# Patient Record
Sex: MALE | Race: WHITE | NOT HISPANIC OR LATINO | Employment: OTHER | URBAN - METROPOLITAN AREA
[De-identification: names, ages, dates, MRNs, and addresses within clinical notes are randomized per-mention and may not be internally consistent; named-entity substitution may affect disease eponyms.]

---

## 2019-05-27 ENCOUNTER — TRANSFERRED RECORDS (OUTPATIENT)
Dept: HEALTH INFORMATION MANAGEMENT | Facility: CLINIC | Age: 79
End: 2019-05-27

## 2020-08-04 ENCOUNTER — TRANSFERRED RECORDS (OUTPATIENT)
Dept: HEALTH INFORMATION MANAGEMENT | Facility: CLINIC | Age: 80
End: 2020-08-04

## 2020-08-11 ENCOUNTER — TELEPHONE (OUTPATIENT)
Dept: WOUND CARE | Facility: CLINIC | Age: 80
End: 2020-08-11

## 2020-08-11 NOTE — TELEPHONE ENCOUNTER
Received referral for patient to be seen by Dr Howard. Patient is currently being seen at wound clinic in [facility]. He will have his PCP send a photo to the clinic phone during his appointment today 08/11/2020. Will then schedule visit with Kaleb

## 2020-08-13 NOTE — TELEPHONE ENCOUNTER
Spoke with [facility] director Penelope and she will send the wound photos they were able to get. Once received patient will need to be contacted to be scheduled with Jackie to determine if he is an appropriate patient to see Dr. Howard.

## 2020-08-13 NOTE — TELEPHONE ENCOUNTER
[facility] wound clinic was suppose to send wound photo on Tuesday. Have not received any photo yet. Patient will be seeing them for a dressing change tomorrow and will confirm they have a wound photo and are able to send it to us.

## 2020-08-13 NOTE — TELEPHONE ENCOUNTER
Ischial Tuberosity wound: 0.5 X 0.3 X 3.7    Patient scheduled with Jackie Tuesday 9/8/2020 @ 8:50am.

## 2020-09-08 ENCOUNTER — HOSPITAL ENCOUNTER (OUTPATIENT)
Dept: WOUND CARE | Facility: CLINIC | Age: 80
Discharge: HOME | End: 2020-09-08
Attending: PHYSICIAN ASSISTANT | Admitting: PHYSICIAN ASSISTANT
Payer: COMMERCIAL

## 2020-09-08 VITALS
HEART RATE: 97 BPM | HEIGHT: 71 IN | DIASTOLIC BLOOD PRESSURE: 69 MMHG | SYSTOLIC BLOOD PRESSURE: 148 MMHG | RESPIRATION RATE: 18 BRPM | BODY MASS INDEX: 41.72 KG/M2 | WEIGHT: 298 LBS | TEMPERATURE: 97.3 F

## 2020-09-08 DIAGNOSIS — L89.314 PRESSURE INJURY OF RIGHT ISCHIUM, STAGE 4 (H): ICD-10-CM

## 2020-09-08 PROCEDURE — 97602 WOUND(S) CARE NON-SELECTIVE: CPT

## 2020-09-08 PROCEDURE — 99202 OFFICE O/P NEW SF 15 MIN: CPT | Performed by: PHYSICIAN ASSISTANT

## 2020-09-08 PROCEDURE — G0463 HOSPITAL OUTPT CLINIC VISIT: HCPCS | Mod: 25

## 2020-09-08 ASSESSMENT — MIFFLIN-ST. JEOR: SCORE: 2088.85

## 2020-09-08 NOTE — PROGRESS NOTES
[facility]    ASSESSMENT:   1. Stage 4 pressure ulcer of R IT  2. History of osteomyelitis of R IT, currently felt to be treated    PLAN/DISCUSSION:   1. Due to small size of wound and resolved osteomyelitis would not currently recommend flap surgery. If wound stalls and/or OM recurs could consider I&D (including bone if indicated) with NPWT placement or possible flap coverage with Dr. Howard. At this point, recommend he continues to work with [facility] as he is receiving excellent wound care with their team.   2. Continue wound care dressing plan per [facility] team    HISTORY OF PRESENT ILLNESS:   Lanre Choi is a 80 year old male who presents today for a surgical consult regarding a stage 4 pressure ulcer of his right IT. Mr. Choi has had several back surgeries and has been unable to ambulate since May 2019. Developed a stage 4 pressure ulcer in June 2019. Evidence of OM in September for which he completed a course of IV antibiotics. Repeat MRI in February suspicious for residual OM. Bone biopsy was then performed in March and was negative for OM. Started on NuShield weekly with NPWT and did 5 treatments. In April concern for OM per ortho team. Started HBO in May and completed 60 dives. Restarted NuShield in June. Switched to PuraPly in July. Last measurement I have from the [facility] team in 4 cm in depth.     OFFLOADING: on group 2 mattress, on Roho cushion that has been pressure mapped, wheelchair was his friends    BOWEL/BLADDER: occasional incontinence, utilizes bedpan     REVIEW OF SYSTEMS:  CONSTITUTIONAL: Denies fevers or acute illness  ENDOCRINE: diabetes under good control    PAST MEDICAL HISTORY:   Patient Active Problem List   Diagnosis     Coronary atherosclerosis     Diabetes mellitus, type 2 (H)     Essential hypertension     Hyperlipidemia     Obesity     Generalized osteoarthrosis, involving multiple sites     Sleep apnea     Esophageal reflux      "Cellulitis and abscess of leg, except foot     Clostridium difficile diarrhea     Pressure injury of right ischium, stage 4 (H)     SOCIAL HISTORY: residing in [facility], has [facility] in [place] with wife  TOBACCO STATUS:  has no history on file for tobacco.    MEDICATIONS:   Current Outpatient Medications   Medication     amLODIPine (NORVASC) 10 MG tablet     aspirin 325 MG tablet     cefadroxil (DURICEF) 500 MG capsule     clopidogrel (PLAVIX) 75 MG tablet     furosemide (LASIX) 80 MG tablet     LANTUS VIAL 100 UNITS/ML SC SOLN     lisinopril (PRINIVIL,ZESTRIL) 40 MG tablet     metFORMIN (GLUCOPHAGE) 1000 MG tablet     metoprolol (LOPRESSOR) 25 MG tablet     Multiple Vitamins-Minerals (THEREMS M) TABS     oxyCODONE-acetaminophen (ROXICET) 5-325 MG per tablet     potassium chloride SA (K-DUR,KLOR-CON M) 20 MEQ tablet     simvastatin (ZOCOR) 40 MG tablet     spironolactone (ALDACTONE) 25 MG tablet     zolpidem (AMBIEN) 5 MG tablet     No current facility-administered medications for this encounter.        VITALS: BP (!) 148/69   Pulse 97   Temp 97.3  F (36.3  C) (Temporal)   Resp 18   Ht 1.803 m (5' 11\")   Wt 135.2 kg (298 lb)   BMI 41.56 kg/m       RELEVANT IMAGING: MRI from June reviewed in Care Everywhere    PHYSICAL EXAM:  GENERAL: Patient is alert and oriented and in no acute distress  INTEGUMENTARY:   Wound (used by OP [facility] only) 09/08/20 0840 Right ischial tuberosity pressure injury (Active)   Length (cm) 1 09/08/20 0800   Width (cm) 0.4 09/08/20 0800   Depth (cm) 3.5 09/08/20 0852   Wound (cm^2) 0.4 cm^2 09/08/20 0800   Wound Volume (cm^3) 0.96 cm^3 09/08/20 0800   Dressing Appearance moist drainage 09/08/20 0800   Drainage Characteristics/Odor serosanguineous 09/08/20 0800   Drainage Amount copious 09/08/20 0852   Thickness/Stage Stage 4 09/08/20 0852   Base red/granulating 09/08/20 0852   Periwound intact 09/08/20 0852   Periwound Temperature warm 09/08/20 0852   Care, Wound non-select wound debridement " performed 09/08/20 8208           FOLLOW-UP: with [facility], PRN with us if wound stalls or recurrent OM    ERIC LINDQUIST PA-C

## 2020-09-08 NOTE — PROGRESS NOTES
Patient arrived for wound care visit. Certified Wound Care Nurse time spent evaluating patient record, completed a full evaluation and documented wound(s) & delma-wound skin; provided recommendation based on treatment plan. Applied dressing, reviewed discharge instructions, patient education, and discussed plan of care with appropriate medical team staff members and patient and/or family members.

## 2020-09-08 NOTE — LETTER
September 8, 2020      Dear Dr. Sexton,    Thank you for your referral of Mr. Lanre Choi. At this point we do not recommend flap surgery due to the relatively small size of the wound, resolved osteomyelitis and impression that the wound is improving with your excellent care. If the wound stalls and/or osteomyelitis recurs Mr. Choi may be a candidate for surgical intervention with our team. He would likely be considered for a bone debridement with NPWT or flap coverage. We would be happy to see him in the future for another consult, but at this point we will have him continue to follow with your team.       Respectfully,        Niyah Duran PA-C

## 2020-09-08 NOTE — DISCHARGE INSTRUCTIONS
"    [facility]  [address] [facility] [address] [address]    Call us at [phone] if you have any questions about your wounds, have redness or swelling around your wound, have a fever of 101 or greater or if you have any other problems or concerns. We answer the phone Monday through Friday 8 am to 4 pm, please leave a message as we check the voicemail frequently throughout the day.     Lanre Choi      1940    A diet high in protein is important for wound healing, we recommend getting 90 grams of protein per day. Taking protein shakes or bars are a good way to get extra protein in your diet.  Other good sources of protein:  Pork 26g per 3 oz  Whey protein powder - 24g per scoop (on average)  Greek yogurt - 23g per 8oz   Chicken or Turkey - 23g per 3oz  Fish - 20-25g per 3oz  Beef - 18-23g per 3oz  Navy beans - 20g per cup  Cottage cheese - 14g per 1/2 cup   Lentils - 13g per 1/4 cup  Beef jerky 13g per 1oz  2% milk - 8g per cup  Peanut butter - 8g per 2 tablespoons  Eggs - 6g per egg  Mixed nuts - 6g per 2oz     The wound is too small to be considered for a flap surgery and patient is not interested in this option due to the long rehab process post procedure (would have to lay flat in bed for 4-6 weeks)    Continue current wound care to right ischial tuberosity: After cleansing with saline or wound cleanser, apply small amount of VASHE on gauze, lay into wound bed, let sit for 10 minutes, remove gauze (do not rinse) then apply dressing. 1/2\" Iodoform placed in our clinic today followed by adhesive foam.     Niyah Duran PA-C. September 8, 2020    Follow up with [facility]  " 62 y.o. M with h/o chronic a-fib on A/C, DM2, HTN, Cardiomyopathy, ? TIA's, admitted for lap partial left nephrectomy. S/P OR, POD #4.

## 2020-10-06 ENCOUNTER — HOSPITAL ENCOUNTER (OUTPATIENT)
Dept: WOUND CARE | Facility: CLINIC | Age: 80
End: 2020-10-06
Attending: PHYSICIAN ASSISTANT
Payer: COMMERCIAL

## 2020-10-06 DIAGNOSIS — L89.314 PRESSURE INJURY OF RIGHT ISCHIUM, STAGE 4 (H): ICD-10-CM

## 2020-10-06 PROCEDURE — 99213 OFFICE O/P EST LOW 20 MIN: CPT | Performed by: PHYSICIAN ASSISTANT

## 2020-10-06 RX ORDER — IBUPROFEN 200 MG/1
TABLET, FILM COATED ORAL
COMMUNITY
Start: 2020-04-13

## 2020-10-06 RX ORDER — CALCITONIN SALMON 200 [IU]/.09ML
1 SPRAY, METERED NASAL
COMMUNITY

## 2020-10-06 RX ORDER — AMOXICILLIN 250 MG
2 CAPSULE ORAL
COMMUNITY
Start: 2020-01-23

## 2020-10-06 RX ORDER — ACETAMINOPHEN 500 MG
1000 TABLET ORAL
COMMUNITY
Start: 2019-10-17

## 2020-10-06 RX ORDER — LOSARTAN POTASSIUM 25 MG/1
25 TABLET ORAL
COMMUNITY
Start: 2019-10-18

## 2020-10-06 RX ORDER — OXYCODONE HYDROCHLORIDE 20 MG/1
TABLET ORAL
COMMUNITY
Start: 2020-09-22

## 2020-10-06 RX ORDER — CALCIUM CARBONATE 500 MG/1
500 TABLET, CHEWABLE ORAL
COMMUNITY

## 2020-10-06 RX ORDER — POTASSIUM CHLORIDE 750 MG/1
10 TABLET, EXTENDED RELEASE ORAL
COMMUNITY
Start: 2020-01-24

## 2020-10-06 RX ORDER — FERROUS SULFATE 325(65) MG
325 TABLET ORAL 2 TIMES DAILY
COMMUNITY
Start: 2020-08-31

## 2020-10-06 RX ORDER — TORSEMIDE 20 MG/1
10 TABLET ORAL
COMMUNITY
Start: 2019-10-18

## 2020-10-06 RX ORDER — POLYETHYLENE GLYCOL 3350 17 G/17G
17 POWDER, FOR SOLUTION ORAL
COMMUNITY
Start: 2019-06-03

## 2020-10-06 RX ORDER — ACETAMINOPHEN 325 MG/1
650 TABLET ORAL
COMMUNITY
Start: 2019-10-17

## 2020-10-06 RX ORDER — ALENDRONATE SODIUM 70 MG/1
70 TABLET ORAL
COMMUNITY

## 2020-10-06 RX ORDER — TAMSULOSIN HYDROCHLORIDE 0.4 MG/1
0.8 CAPSULE ORAL
COMMUNITY

## 2020-10-06 RX ORDER — GABAPENTIN 300 MG/1
900 CAPSULE ORAL
COMMUNITY
Start: 2019-09-13

## 2020-10-06 RX ORDER — FINASTERIDE 5 MG/1
5 TABLET, FILM COATED ORAL
COMMUNITY
Start: 2019-10-18

## 2020-10-06 NOTE — DISCHARGE INSTRUCTIONS
[facility]  [address] [facility] [address] [facility] [address]    Call us at [phone] if you have any questions about your wounds, have redness or  swelling around your wound, have a fever of 101 or greater or if you have any other  problems or concerns. We answer the phone Monday through Friday 8 am to 4 pm,  please leave a message as we check the voicemail frequently throughout the day.    Lanre Choi 1940    A diet high in protein is important for wound healing, we recommend getting 90 grams  of protein per day. Taking protein shakes or bars are a good way to get extra protein in  your diet. Other good sources of protein:  Pork 26g per 3 oz  Whey protein powder - 24g per scoop (on average)  Greek yogurt - 23g per 8oz  Chicken or Turkey - 23g per 3oz  Fish - 20-25g per 3oz  Beef - 18-23g per 3oz  Navy beans - 20g per cup  Cottage cheese - 14g per 1/2 cup  Lentils - 13g per 1/4 cup  Beef jerky 13g per 1oz  2% milk - 8g per cup  Peanut butter - 8g per 2 tablespoons  Eggs - 6g per egg  Mixed nuts - 6g per 2oz    Options for wound:  1. Repeat imaging (CT scan or MRI) and investigate if there is osteomyelitis. If there is, have Infectious Disease manage antibiotics after getting a bone biopsy and debridement with wound vac placement done by a General Surgeon closer to patient's home. The patient is not interested in this option.  2.The wound is too small to be considered for a flap surgery and patient is not interested in this option due to the long rehab process post procedure (would have to lay flat in bed for 4-6 weeks).  3. Do no treatment (sugically) and have a chronic wound. Patient would like to pursue this option at this time.     Wound care recommendations to right ischial tuberosity  After cleansing with saline or wound cleanser, irrigate with a small amount of VASHE and cover with gauze, let sit for 10 minutes, remove gauze (do not rinse) then apply cover dressing.  (Do not pack wound) and change daily.    Niyah Duran PA-C. October 5, 2020    Follow up with [facility]

## 2020-10-06 NOTE — PROGRESS NOTES
[facility]    ASSESSMENT:   1. Stage 4 pressure ulcer of R IT  2. History of osteomyelitis of R IT, suspicion of chronic infection    PLAN/DISCUSSION:   1. Discussed options with Mr. Choi:  1. Transition to palliative plan - use diapers as cover dressing and monitor for worsening/infection lifelong  2. If desire to undergo surgery in attempt to heal then pursue workup for OM - repeat MRI, draw ESR and CRP. If suggestive of OM then recommend bone I&D, directed antibiotic therapy and NPWT. Could do this locally if surgeon available and willing, otherwise can consult with Dr. Howard next available (December).  Due to size of wound do not recommend flap coverage unless OM extensive on imaging.   2. In the meantime, recommend pulling the packing and simply flushing wound with Vashe solution to encourage wound to close from the sides. Risk on infection discussed.       HISTORY OF PRESENT ILLNESS:   Lanre Choi is a 80 year old male who returns today for a stalled stage 4 pressure ulcer of his right IT. Mr. Choi has had several back surgeries and has been unable to ambulate since May 2019. Developed a stage 4 pressure ulcer in June 2019. Evidence of OM in September for which he completed a course of IV antibiotics. Repeat MRI in February suspicious for residual OM. Bone biopsy was then performed in March and was negative for OM. Started on NuShield weekly with NPWT and did 5 treatments. In April concern for OM per ortho team. Started HBO in May and completed 60 dives. Restarted NuShield in June. Switched to PuraPly in July.    10/06/20: Wound has stalled in depth but more narrow than at our initial visit. Believes he is currently packing with [employer]. No further workup since last visit a month ago. Denies systemic or local symptoms of acute infection.     OFFLOADING: on group 2 mattress, on Roho cushion that has been pressure mapped, wheelchair was his friends    BOWEL/BLADDER: occasional  incontinence, utilizes bedpan     REVIEW OF SYSTEMS:  CONSTITUTIONAL: Denies fevers or acute illness  ENDOCRINE: diabetes under good control    PAST MEDICAL HISTORY:   Patient Active Problem List   Diagnosis     Coronary atherosclerosis     Diabetes mellitus, type 2 (H)     Essential hypertension     Hyperlipidemia     Obesity     Generalized osteoarthrosis, involving multiple sites     Sleep apnea     Esophageal reflux     Cellulitis and abscess of leg, except foot     Clostridium difficile diarrhea     Pressure injury of right ischium, stage 4 (H)     SOCIAL HISTORY: residing in [facility], has [facility] in [place] with wife  TOBACCO STATUS: quit 45 years ago, heavy smoker prior    MEDICATIONS:   Current Outpatient Medications   Medication     acetaminophen (TYLENOL) 325 MG tablet     acetaminophen (TYLENOL) 500 MG tablet     alendronate (FOSAMAX) 70 MG tablet     amLODIPine (NORVASC) 10 MG tablet     apixaban ANTICOAGULANT (ELIQUIS) 5 MG tablet     aspirin 325 MG tablet     calcitonin, salmon, (MIACALCIN) 200 UNIT/ACT nasal spray     calcium carbonate (TUMS) 500 MG chewable tablet     cefadroxil (DURICEF) 500 MG capsule     clopidogrel (PLAVIX) 75 MG tablet     diclofenac (VOLTAREN) 1 % topical gel     ferrous sulfate (FEROSUL) 325 (65 Fe) MG tablet     finasteride (PROSCAR) 5 MG tablet     furosemide (LASIX) 80 MG tablet     gabapentin (NEURONTIN) 300 MG capsule     IBUPROFEN 200 MG tablet     LANTUS VIAL 100 UNITS/ML SC SOLN     lisinopril (PRINIVIL,ZESTRIL) 40 MG tablet     losartan (COZAAR) 25 MG tablet     metFORMIN (GLUCOPHAGE) 1000 MG tablet     metoprolol (LOPRESSOR) 25 MG tablet     Multiple Vitamins-Minerals (THEREMS M) TABS     oxyCODONE HCl (ROXICODONE) 20 MG TABS immediate release tablet     oxyCODONE-acetaminophen (ROXICET) 5-325 MG per tablet     polyethylene glycol (MIRALAX) 17 GM/Dose powder     potassium chloride ER (K-TAB/KLOR-CON) 10 MEQ CR tablet     potassium chloride SA (K-DUR,KLOR-CON M) 20 MEQ  tablet     senna-docusate (SENOKOT-S/PERICOLACE) 8.6-50 MG tablet     sertraline (ZOLOFT) 50 MG tablet     simvastatin (ZOCOR) 40 MG tablet     spironolactone (ALDACTONE) 25 MG tablet     tamsulosin (FLOMAX) 0.4 MG capsule     torsemide (DEMADEX) 20 MG tablet     vitamin C (ASCORBIC ACID) 250 MG TABS tablet     zolpidem (AMBIEN) 5 MG tablet     No current facility-administered medications for this encounter.        VITALS: There were no vitals taken for this visit.     RELEVANT IMAGING: MRI from June reviewed in Care Everywhere    PHYSICAL EXAM:  GENERAL: Patient is alert and oriented and in no acute distress  INTEGUMENTARY:   Wound (used by OP [facility] only) 09/08/20 0840 Right ischial tuberosity pressure injury (Active)   Thickness/Stage Stage 4 10/06/20 0825   Dressing Appearance moist drainage 10/06/20 0800   Base granulating 10/06/20 0825   Periwound intact;macerated 10/06/20 0825   Periwound Temperature warm 10/06/20 0825   Length (cm) 0.5 10/06/20 0800   Width (cm) 0.3 10/06/20 0800   Depth (cm) 4 10/06/20 0800   Wound (cm^2) 0.15 cm^2 10/06/20 0800   Wound Volume (cm^3) 0.6 cm^3 10/06/20 0800   Wound healing % 62.5 10/06/20 0800   Drainage Characteristics/Odor serosanguineous 10/06/20 0800   Drainage Amount moderate 10/06/20 0800   Care, Wound non-select wound debridement performed 10/06/20 0825           FOLLOW-UP: with [facility] team, with Dr. Howard after OM work-up if desires surgical management    ERIC LINDQUIST PA-C

## 2021-09-21 ENCOUNTER — PATIENT OUTREACH (OUTPATIENT)
Dept: CARE COORDINATION | Facility: CLINIC | Age: 81
End: 2021-09-21

## 2021-09-21 NOTE — PROGRESS NOTES
Clinical Product Navigator RN reviewed chart; patient on payer product coverage.  Review results: Not met any any referral criteria at this time.  Will monitor for future needs    Daniela Alonzo RN/Clinical Product Navigator

## 2023-01-01 ENCOUNTER — OFFICE VISIT (OUTPATIENT)
Dept: ORTHOPEDICS | Facility: CLINIC | Age: 83
End: 2023-01-01
Payer: COMMERCIAL

## 2023-01-01 ENCOUNTER — TELEPHONE (OUTPATIENT)
Dept: ORTHOPEDICS | Facility: CLINIC | Age: 83
End: 2023-01-01
Payer: COMMERCIAL

## 2023-01-01 ENCOUNTER — TRANSFERRED RECORDS (OUTPATIENT)
Dept: HEALTH INFORMATION MANAGEMENT | Facility: CLINIC | Age: 83
End: 2023-01-01

## 2023-01-01 ENCOUNTER — TRANSFERRED RECORDS (OUTPATIENT)
Dept: HEALTH INFORMATION MANAGEMENT | Facility: CLINIC | Age: 83
End: 2023-01-01
Payer: COMMERCIAL

## 2023-01-01 ENCOUNTER — ANCILLARY PROCEDURE (OUTPATIENT)
Dept: GENERAL RADIOLOGY | Facility: CLINIC | Age: 83
End: 2023-01-01
Attending: ORTHOPAEDIC SURGERY
Payer: COMMERCIAL

## 2023-01-01 ENCOUNTER — PRE VISIT (OUTPATIENT)
Dept: ORTHOPEDICS | Facility: CLINIC | Age: 83
End: 2023-01-01

## 2023-01-01 DIAGNOSIS — M48.04 THORACIC SPINAL STENOSIS: ICD-10-CM

## 2023-01-01 DIAGNOSIS — M48.04 THORACIC SPINAL STENOSIS: Primary | ICD-10-CM

## 2023-01-01 PROCEDURE — 99205 OFFICE O/P NEW HI 60 MIN: CPT | Mod: GC | Performed by: ORTHOPAEDIC SURGERY

## 2023-01-01 PROCEDURE — 77073 BONE LENGTH STUDIES: CPT | Mod: 52 | Performed by: STUDENT IN AN ORGANIZED HEALTH CARE EDUCATION/TRAINING PROGRAM

## 2023-01-01 PROCEDURE — 72082 X-RAY EXAM ENTIRE SPI 2/3 VW: CPT | Mod: 52 | Performed by: STUDENT IN AN ORGANIZED HEALTH CARE EDUCATION/TRAINING PROGRAM

## 2023-01-01 RX ORDER — TORSEMIDE 10 MG/1
TABLET ORAL
COMMUNITY
Start: 2023-01-01

## 2023-01-01 RX ORDER — ROSUVASTATIN CALCIUM 10 MG/1
TABLET, COATED ORAL
COMMUNITY
Start: 2021-12-22

## 2023-01-01 RX ORDER — DULOXETIN HYDROCHLORIDE 60 MG/1
60 CAPSULE, DELAYED RELEASE ORAL
COMMUNITY

## 2023-01-01 RX ORDER — LEVOFLOXACIN 750 MG/1
750 TABLET, FILM COATED ORAL
COMMUNITY
Start: 2023-01-01 | End: 2023-01-01

## 2023-01-01 RX ORDER — FERROUS GLUCONATE 324(38)MG
324 TABLET ORAL
COMMUNITY

## 2023-07-31 NOTE — TELEPHONE ENCOUNTER
Called Pt. To make appointment for referral from Dr. Marie of [facility].  Lumbar Fusion developed severe Thoracic Stenosis

## 2023-08-01 NOTE — TELEPHONE ENCOUNTER
See phone message 7-31-23 when Trae left message for pt. To call back & make appt.    I called  again today 8-1-23 & spoke to pt in [facility][phone] who was already aware of needing to schedule appt.  I provided pt & TCU with our contact info.    I spoke directly to Teri who [facility] & offered New appt this week with  but she stated they can't set up transport that quickly so it needs to be next week,.  Scheduled for 8-`10-23 & I contacted our records dept to get imaging from [facility].    aware.  Call back prn. Pt agreed.  Daniela Alatorre RN.

## 2023-08-09 NOTE — TELEPHONE ENCOUNTER
Records Requested     August 9, 2023 8:59 AM  [address] Orthopedic medical records   Phone: [phone]  Fax:  [phone]  Email: [email or web address]   Outcome Sent an urgent fax for records and images - Amay     8/9/23 1:50PM called medical records, sat on hold for 12 mins. Got a hold of a live person, asked that they fax over all records before the end of the day for tomorrow's appt - Amay      Images  Requested     August 9, 2023 9:01 AM  [address] (MRN [id number])  F. [phone]    [facility] (cannot push)   [address] , [place] [address]   Ph. [phone] - fx. [phone]     Outcome Sent a fax to [facility] to push images and [facility] to mail out a disc. Called [facility], asked that they push the CT Cervical from 8/4/23 and fax report over - Amay     8/9/23 1:50PM images received from [facility], waiting for disc from [facility]- Amay          DIAGNOSIS: NEED EOS XR IF PT CAN STAND-IF CANT STAND DO SCOLI FULL SPINE ON 4TH FLOOR. NEED CT CERVICAL SCHEDULED FOR 8-4-23 AT [facility].  referred by  at [facility]. Being transported from [facility]#[phone].  LE Weakness from Severe Thoracic S/P Extnnded Fusion to T8 per Daniela HOLLIDAY    APPOINTMENT DATE: 8/10/23   NOTES STATUS DETAILS   OFFICE NOTE from referring provider In process - received  Papito Marie M.D - [facility]  7/25/23 - 3/16/23 OVs     OPERATIVE REPORT Care Everywhere [facility]  5/27/19 2 LEVEL DECOMPRESSION SPINE [id number]     5/16/19 POSTERIOR SPINAL FUSION T8-T12, REMOVAL OF INSTRUMENTATION BILATERAL T11, CEMENT AUGMENTATION T8-T10     12/26/18 REMOVAL OF INSTRUMENTATION EXPLORATION OF FUSION T11-S1, BILATERAL PELVIC FIXATION, REVISION POSTERIOR SPINAL FUSION L5-S1    MEDICATION LIST Care Everywhere    LABS & IMAGING      CBC/DIFF Care Everywhere [facility]: 6/16/23   Joseluis Rich  Report: care everywhere     Images: req 8/9/23 XR  Thoracic and Lumbar Spine: 10/13/2020  CT Lumbar Spine: 10/9/2018    FEDEX trackin     Hilario Cortes  Report: care everywhere     Images: req 23 - received  CT Lumbar and Thoracic Spine: 19  MR Lumbar and Thoracic Spine: 19  XR Thoracolumbar: 19  CT Thoracic Spine: 19  MR Lumbar Spine: 11/2/15     [facility]  Received CT Cervical Spine: 23  MR Spine: 6/15/23 - 7/30/15  CT Thoracic Spine: 3/31/23

## 2023-08-10 NOTE — LETTER
8/10/2023         RE: Lanre Choi  [address]  [facility] [address]        Dear Colleague,    Thank you for referring your patient, Lnare Choi, to the [facility] [place]. Please see a copy of my visit note below.    Spine Surgical Hx:  10/23/2015 - 2-level TLIF L3-L5; use of allograft + BMA + DBM (BAHMAN Marie) for degen spondy with stenosis L3-L5.  [Implants: Medtronic TSRH 3DX screw system; Capstone PTC cage].  06/15/2018 - Rev PISF L1-S1; TLIF L5-S1; Laminectomies L1-L3 (2 levels); use of Medium kit Infuse BMP and allograft (BAHMAN Marie) for stenosis L1-L3; bilateral foraminal stenosis L>R L5-S1; VCF L2.  [Implants: Medtronic Solera screw system; Elevate cage].  11/16/2018 - Rev PISF T11-L2; cement augmentation of screws bilat T11 and T12; use of BMP and allograft (BAHMAN Marie) for PJK and VCF L1.  [Implants: Medtronic Solera screws].  12/26/2018 - Rev Post instr T11-S1; bilateral pelvic fixation; Rev PSF L5-S1; use of Small kit Infuse BMP and allograft (BAHMAN Marie) for pseudarthrosis with instr failure L5-S1.  [Implants: Medtronic Solera and Ballast screw system].  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  [Implants: Medtronic Solera system].  05/27/2019 - Laminectomies T10-T12 (2 levels) (BAHMAN Marie) for stenosis T10-12.      In-Person Visit    REASON FOR CONSULTATION: RECHECK (referred by  at [place]. Being transported from [facility]#[phone]./LE Weakness from Severe Thoracic S/P Extnnded Fusion to T8 per Daniela K /)     REFERRING PHYSICIAN: No ref. provider found  PCP:Jenniffer Hope    History of Present Illness:  83 year old male with PMHx type 2 diabetes, CKD stage III, HFpEF, history of atrial flutter on eliquis, LAURE on CPAP, stage IV pressure ulcer right ischium ,  referred by Dr. Papito Marie ([facility]) for proximal junctional problems s/p thoracolumbar fusion.    Patient is primarily here today as a referral and due to his ongoing left low back pain.  He states that this has been present since his last back surgery with Dr. Marie.  Notably, the patient does not really endorse any upper extremity symptoms.  He denies any paresthesias to his bilateral upper extremities.  Denies any weakness to his bilateral upper extremities.  He has no issues with fine motor movements.  He is able to button shirts and text on his phone easily with both hands.  He does endorse a little bit of right-sided neck pain, but this is really not his biggest issue.  He is primarily complaining of low back pain which has been present for multiple years since his last surgery approximately 3 to 4 years ago.  Notably, he has had left lower extremity weakness has been present since his surgery 4 years ago.  This has not really improved.  He has not walked for at least a year.  He states that he used to use a walker at one point, but he is now wheelchair-bound.  He is not currently in physical therapy.  He has not received any corticosteroid injections into his back for multiple years.  Regarding his back and leg pain he states that it is about 90% back and 10% leg.      Oswestry (LOPEZ) Questionnaire        8/10/2023     3:14 PM   OSWESTRY DISABILITY INDEX   Count 8   Sum 20   Oswestry Score (%) 50 %     LOPEZ 08/10/23 50%    No NDI obtained.      Visual Analog Pain Scale  Back Pain Scale 0-10: 8  Right leg pain: 2  Left leg pain: 3  Neck Pain Scale 0-10: 8  Right arm pain: 0  Left arm pain: 0    PROMIS-10 Scores  Global Mental Health Score: 14  Global Physical Health Score: 10  PROMIS TOTAL - SUBSCORES: 24    ROS:  A 12-point review of systems was completed and is negative except for otherwise noted above in the history of present illness.    Med Hx:  No past medical history on file.    Surg  Hx:  No past surgical history on file.    Allergies:  Allergies   Allergen Reactions    Meropenem Other (See Comments)     Elevated liver enzymes       Meds:  Current Outpatient Medications   Medication    acetaminophen (TYLENOL) 325 MG tablet    acetaminophen (TYLENOL) 500 MG tablet    alendronate (FOSAMAX) 70 MG tablet    apixaban ANTICOAGULANT (ELIQUIS) 5 MG tablet    aspirin 325 MG tablet    calcitonin, salmon, (MIACALCIN) 200 UNIT/ACT nasal spray    diclofenac (VOLTAREN) 1 % topical gel    diclofenac (VOLTAREN) 1 % topical gel    ferrous sulfate (FEROSUL) 325 (65 Fe) MG tablet    finasteride (PROSCAR) 5 MG tablet    gabapentin (NEURONTIN) 300 MG capsule    IBUPROFEN 200 MG tablet    levofloxacin (LEVAQUIN) 750 MG tablet    metFORMIN (GLUCOPHAGE) 1000 MG tablet    Multiple Vitamins-Minerals (THEREMS M) TABS    oxyCODONE-acetaminophen (ROXICET) 5-325 MG per tablet    rosuvastatin (CRESTOR) 10 MG tablet    senna-docusate (SENOKOT-S/PERICOLACE) 8.6-50 MG tablet    tamsulosin (FLOMAX) 0.4 MG capsule    torsemide (DEMADEX) 10 MG tablet    torsemide (DEMADEX) 20 MG tablet    vitamin C (ASCORBIC ACID) 250 MG TABS tablet    amLODIPine (NORVASC) 10 MG tablet    calcium carbonate (TUMS) 500 MG chewable tablet    cefadroxil (DURICEF) 500 MG capsule    clopidogrel (PLAVIX) 75 MG tablet    DULoxetine (CYMBALTA) 60 MG capsule    ferrous gluconate (FERGON) 324 (38 Fe) MG tablet    furosemide (LASIX) 80 MG tablet    LANTUS VIAL 100 UNITS/ML SC SOLN    lisinopril (PRINIVIL,ZESTRIL) 40 MG tablet    losartan (COZAAR) 25 MG tablet    metoprolol (LOPRESSOR) 25 MG tablet    oxyCODONE HCl (ROXICODONE) 20 MG TABS immediate release tablet    polyethylene glycol (MIRALAX) 17 GM/Dose powder    potassium chloride ER (K-TAB/KLOR-CON) 10 MEQ CR tablet    potassium chloride SA (K-DUR,KLOR-CON M) 20 MEQ tablet    sertraline (ZOLOFT) 50 MG tablet    simvastatin (ZOCOR) 40 MG tablet    spironolactone (ALDACTONE) 25 MG tablet    zolpidem (AMBIEN)  5 MG tablet     No current facility-administered medications for this visit.       Fam Hx:  No family history on file.    P/S Hx:  Social History     Tobacco Use    Smoking status: Former     Types: Cigarettes    Smokeless tobacco: Never   Substance Use Topics    Alcohol use: Not Currently         Physical Exam:  Very pleasant, healthy appearing, alert, oriented x 3, cooperative.  Normal mood and affect.  Not in cardiorespiratory distress.  There were no vitals taken for this visit.  Normal upright posture.      No gross spinal deformity, no skin lesions or surgical scars.  Localizes pain at left low back  Tenderness: (-) midline, (-) paraspinal, (-) R and L PSIS.  Neuro Exam:  Motor:   Motor Strength Right Left   Deltoids: C5 5/5 5/5   Biceps: C5 5/5 5/5   Brachialis: C6 5/5 5/5   Wrist extension: C6 5/5 5/5   Triceps: C7  5/5 5/5   Wrist flexion: C7 5/5 5/5    strength: C8 5/5 5/5   Hand intrinsics: T1 5/5 5/5     Sensation from C4-L1 is preserved.    1+ triceps, biceps, brachioradialis reflexes bilateral upper extremities  Negative Alexei sign bilaterally  Performs rapid alternating movements  Negative Spurling's test    Motor Strength Right Left   Hip flexion: L1, L2, L3 4/5 4/5   Hip adduction: L2, L3 4/5 4/5   Knee flexion: S1 5/5 4/5   Knee extension: L3, L4 4/5 4/5   Ankle dosiflexion: L4, L5 5/5 5/5   EHL: L5 4/5 4/5   Ankle plantarflexion: S1 4/5 4/5     Sensation from L1-S2 is preserved.    Imaging:    EOS total body imaging dated 08/10/2023 personally reviewed and interpreted by me today.  Notably, imaging is significantly limited due to contrast from wheelchair and soft tissue shadow.  Overall, patient's thoracolumbar instrumentation appears to be in stable alignment relative to prior radiographs.  No evidence of overt hardware loosening or breakage.  No notable bony abnormalities noted    MRI of the cervical spine dated 06/15/2023 demonstrates severe spinal canal stenosis at the C3-C4, C4-C5, and  C5-C6 vertebral levels.  There are significant bilateral degenerative changes throughout the cervical spine.  Axial imaging there is obvious presence of CSF and cord effacement particularly at the previously noted stenotic levels    Assessment:    1.  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  Multiple previous thoracolumbar spine surgeries all performed by Dr. Marie at [facility], culminating in T8-pelvis fusion; last surgery laminectomies T10-T12 (5/27/2019).  3.  Chronic left lower extremity weakness after multiply operated thoracolumbar spine  4.  Complex past medical history including: type 2 diabetes, CKD stage III, HFpEF, history of atrial flutter on eliquis, LAURE on CPAP, stage IV pressure ulcer right ischium.  5.  Class II obesity (9/8/2020 BMI 41.56).    Plan:    Discussed the patient's clinical symptoms, exam, and imaging findings today.  Overall, and somewhat surprisingly, his primary complaint is regarding his low back pain which has been chronic for many years.  This has been present since his multiple surgeries.  He does not really have any issues with his neck currently or any symptoms of myelopathy despite his severe imaging findings.  At this point, there is No urgent indication to proceed with any sort of cervical spine surgery given the significant nature of the surgery that would need to be performed in order to correct his deformity and decompress his cervical spine.  Any sort of surgery at this point for his cervical spine would be incredibly high risk given his multiple medical comorbidities particularly given that he is on Eliquis.  At this point, it is reasonable to continue to monitor his upper extremities for any occurrence of myelopathy or radiculopathy.  We did discuss that any changes would occur in a more slow and stepwise manner.  The patient also would like to avoid any sort of  cervical spine surgery.  Regarding his low back, his instrumentation does appear to be stable.  There is likely not a surgery that could be offered at this point that would be able to improve his back pain or his left lower extremity weakness in any meaningful manner.  Again, the risks of any sort of surgery in the thoracolumbar spine would also be incredibly high risk.  Primarily, the patient is hoping to get back into physical therapy to optimize what ever function that he currently has.  This is a very reasonable request.  We will refer him to outside physical therapy which he can complete at his senior living facility.  The patient expressed his understanding and agreement with this assessment and plan.  All questions answered.    Referral to physical therapy  Follow-up in [facility] orthopedic spine clinic as needed    Saurav Wei MD  Orthopaedic Surgery PGY-4    Attestation:  I (Dr. Velasquez Hoyt - Spine Surgeon) have personally evaluated patient with PGY-4 Eriberto, and agree with findings and plan outlined in the note, which I also edited.  I discussed at length with the patient/family, explained the nature of spinal condition, and formulated workup and/or treatment plan together.  All questions were answered to the best of my ability and to patient's apparent satisfaction    60 minutes spent on the date of the encounter doing chart review/review of outside records/review of test results/interpretation of tests/patient visit/documentation/discussion with other provider(s)/discussion with patient and family.    Velasquez Hoyt MD    Orthopaedic Spine Surgery  Dept Orthopaedic Surgery, [facility]  [phone] office, [phone] pager  [email or web address]

## 2023-08-10 NOTE — PROGRESS NOTES
Spine Surgical Hx:  10/23/2015 - 2-level TLIF L3-L5; use of allograft + BMA + DBM (BAHMAN Marie) for degen spondy with stenosis L3-L5.  [Implants: Medtronic TSRH 3DX screw system; Capstone PTC cage].  06/15/2018 - Rev PISF L1-S1; TLIF L5-S1; Laminectomies L1-L3 (2 levels); use of Medium kit Infuse BMP and allograft (BAHMAN Marie) for stenosis L1-L3; bilateral foraminal stenosis L>R L5-S1; VCF L2.  [Implants: Medtronic Solera screw system; Elevate cage].  11/16/2018 - Rev PISF T11-L2; cement augmentation of screws bilat T11 and T12; use of BMP and allograft (BAHMAN Marie) for PJK and VCF L1.  [Implants: Medtronic Solera screws].  12/26/2018 - Rev Post instr T11-S1; bilateral pelvic fixation; Rev PSF L5-S1; use of Small kit Infuse BMP and allograft (BAHMAN Marie) for pseudarthrosis with instr failure L5-S1.  [Implants: Medtronic Solera and Ballast screw system].  05/16/2019 - Rev Post instr T8-pelvis; PSF T8-T12; cement augmentation of screws bilat T8,T9,T10; use of Infuse BMP + allograft (Cynthia ANSIMÓN) for pseudarthrosis T11-12 with instr failure T11.  [Implants: Medtronic Solera system].  05/27/2019 - Laminectomies T10-T12 (2 levels) (BAHMAN Marie) for stenosis T10-12.      In-Person Visit    REASON FOR CONSULTATION: RECHECK (referred by  at [facility]. Being transported from [facility]#[phone]./LE Weakness from Severe Thoracic S/P Extnnded Fusion to T8 per Daniela HOLLIDAY /)     REFERRING PHYSICIAN: No ref. provider found  PCP:Jenniffer Hope    History of Present Illness:  83 year old male with PMHx type 2 diabetes, CKD stage III, HFpEF, history of atrial flutter on eliquis, LAURE on CPAP, stage IV pressure ulcer right ischium , referred by Dr. Papito Marie ([facility]) for proximal junctional problems s/p thoracolumbar fusion.    Patient is primarily here today as a referral and due to his ongoing left low back pain.  He states that this has been present since his last back  surgery with Dr. Marie.  Notably, the patient does not really endorse any upper extremity symptoms.  He denies any paresthesias to his bilateral upper extremities.  Denies any weakness to his bilateral upper extremities.  He has no issues with fine motor movements.  He is able to button shirts and text on his phone easily with both hands.  He does endorse a little bit of right-sided neck pain, but this is really not his biggest issue.  He is primarily complaining of low back pain which has been present for multiple years since his last surgery approximately 3 to 4 years ago.  Notably, he has had left lower extremity weakness has been present since his surgery 4 years ago.  This has not really improved.  He has not walked for at least a year.  He states that he used to use a walker at one point, but he is now wheelchair-bound.  He is not currently in physical therapy.  He has not received any corticosteroid injections into his back for multiple years.  Regarding his back and leg pain he states that it is about 90% back and 10% leg.      Oswestry (LOPEZ) Questionnaire        8/10/2023     3:14 PM   OSWESTRY DISABILITY INDEX   Count 8   Sum 20   Oswestry Score (%) 50 %     LOPEZ 08/10/23 50%    No NDI obtained.      Visual Analog Pain Scale  Back Pain Scale 0-10: 8  Right leg pain: 2  Left leg pain: 3  Neck Pain Scale 0-10: 8  Right arm pain: 0  Left arm pain: 0    PROMIS-10 Scores  Global Mental Health Score: 14  Global Physical Health Score: 10  PROMIS TOTAL - SUBSCORES: 24    ROS:  A 12-point review of systems was completed and is negative except for otherwise noted above in the history of present illness.    Med Hx:  No past medical history on file.    Surg Hx:  No past surgical history on file.    Allergies:  Allergies   Allergen Reactions     Meropenem Other (See Comments)     Elevated liver enzymes       Meds:  Current Outpatient Medications   Medication     acetaminophen (TYLENOL) 325 MG tablet      acetaminophen (TYLENOL) 500 MG tablet     alendronate (FOSAMAX) 70 MG tablet     apixaban ANTICOAGULANT (ELIQUIS) 5 MG tablet     aspirin 325 MG tablet     calcitonin, salmon, (MIACALCIN) 200 UNIT/ACT nasal spray     diclofenac (VOLTAREN) 1 % topical gel     diclofenac (VOLTAREN) 1 % topical gel     ferrous sulfate (FEROSUL) 325 (65 Fe) MG tablet     finasteride (PROSCAR) 5 MG tablet     gabapentin (NEURONTIN) 300 MG capsule     IBUPROFEN 200 MG tablet     levofloxacin (LEVAQUIN) 750 MG tablet     metFORMIN (GLUCOPHAGE) 1000 MG tablet     Multiple Vitamins-Minerals (THEREMS M) TABS     oxyCODONE-acetaminophen (ROXICET) 5-325 MG per tablet     rosuvastatin (CRESTOR) 10 MG tablet     senna-docusate (SENOKOT-S/PERICOLACE) 8.6-50 MG tablet     tamsulosin (FLOMAX) 0.4 MG capsule     torsemide (DEMADEX) 10 MG tablet     torsemide (DEMADEX) 20 MG tablet     vitamin C (ASCORBIC ACID) 250 MG TABS tablet     amLODIPine (NORVASC) 10 MG tablet     calcium carbonate (TUMS) 500 MG chewable tablet     cefadroxil (DURICEF) 500 MG capsule     clopidogrel (PLAVIX) 75 MG tablet     DULoxetine (CYMBALTA) 60 MG capsule     ferrous gluconate (FERGON) 324 (38 Fe) MG tablet     furosemide (LASIX) 80 MG tablet     LANTUS VIAL 100 UNITS/ML SC SOLN     lisinopril (PRINIVIL,ZESTRIL) 40 MG tablet     losartan (COZAAR) 25 MG tablet     metoprolol (LOPRESSOR) 25 MG tablet     oxyCODONE HCl (ROXICODONE) 20 MG TABS immediate release tablet     polyethylene glycol (MIRALAX) 17 GM/Dose powder     potassium chloride ER (K-TAB/KLOR-CON) 10 MEQ CR tablet     potassium chloride SA (K-DUR,KLOR-CON M) 20 MEQ tablet     sertraline (ZOLOFT) 50 MG tablet     simvastatin (ZOCOR) 40 MG tablet     spironolactone (ALDACTONE) 25 MG tablet     zolpidem (AMBIEN) 5 MG tablet     No current facility-administered medications for this visit.       Fam Hx:  No family history on file.    P/S Hx:  Social History     Tobacco Use     Smoking status: Former     Types:  Cigarettes     Smokeless tobacco: Never   Substance Use Topics     Alcohol use: Not Currently         Physical Exam:  Very pleasant, healthy appearing, alert, oriented x 3, cooperative.  Normal mood and affect.  Not in cardiorespiratory distress.  There were no vitals taken for this visit.  Normal upright posture.      No gross spinal deformity, no skin lesions or surgical scars.  Localizes pain at left low back  Tenderness: (-) midline, (-) paraspinal, (-) R and L PSIS.  Neuro Exam:  Motor:   Motor Strength Right Left   Deltoids: C5 5/5 5/5   Biceps: C5 5/5 5/5   Brachialis: C6 5/5 5/5   Wrist extension: C6 5/5 5/5   Triceps: C7  5/5 5/5   Wrist flexion: C7 5/5 5/5    strength: C8 5/5 5/5   Hand intrinsics: T1 5/5 5/5     Sensation from C4-L1 is preserved.    1+ triceps, biceps, brachioradialis reflexes bilateral upper extremities  Negative Alexei sign bilaterally  Performs rapid alternating movements  Negative Spurling's test    Motor Strength Right Left   Hip flexion: L1, L2, L3 4/5 4/5   Hip adduction: L2, L3 4/5 4/5   Knee flexion: S1 5/5 4/5   Knee extension: L3, L4 4/5 4/5   Ankle dosiflexion: L4, L5 5/5 5/5   EHL: L5 4/5 4/5   Ankle plantarflexion: S1 4/5 4/5     Sensation from L1-S2 is preserved.    Imaging:    EOS total body imaging dated 08/10/2023 personally reviewed and interpreted by me today.  Notably, imaging is significantly limited due to contrast from wheelchair and soft tissue shadow.  Overall, patient's thoracolumbar instrumentation appears to be in stable alignment relative to prior radiographs.  No evidence of overt hardware loosening or breakage.  No notable bony abnormalities noted    MRI of the cervical spine dated 06/15/2023 demonstrates severe spinal canal stenosis at the C3-C4, C4-C5, and C5-C6 vertebral levels.  There are significant bilateral degenerative changes throughout the cervical spine.  Axial imaging there is obvious presence of CSF and cord effacement particularly at the  previously noted stenotic levels    Assessment:    1.  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  Multiple previous thoracolumbar spine surgeries all performed by Dr. Marie at [facility], culminating in T8-pelvis fusion; last surgery laminectomies T10-T12 (5/27/2019).  3.  Chronic left lower extremity weakness after multiply operated thoracolumbar spine  4.  Complex past medical history including: type 2 diabetes, CKD stage III, HFpEF, history of atrial flutter on eliquis, LAURE on CPAP, stage IV pressure ulcer right ischium.  5.  Class II obesity (9/8/2020 BMI 41.56).    Plan:    Discussed the patient's clinical symptoms, exam, and imaging findings today.  Overall, and somewhat surprisingly, his primary complaint is regarding his low back pain which has been chronic for many years.  This has been present since his multiple surgeries.  He does not really have any issues with his neck currently or any symptoms of myelopathy despite his severe imaging findings.  At this point, there is No urgent indication to proceed with any sort of cervical spine surgery given the significant nature of the surgery that would need to be performed in order to correct his deformity and decompress his cervical spine.  Any sort of surgery at this point for his cervical spine would be incredibly high risk given his multiple medical comorbidities particularly given that he is on Eliquis.  At this point, it is reasonable to continue to monitor his upper extremities for any occurrence of myelopathy or radiculopathy.  We did discuss that any changes would occur in a more slow and stepwise manner.  The patient also would like to avoid any sort of cervical spine surgery.  Regarding his low back, his instrumentation does appear to be stable.  There is likely not a surgery that could be offered at this point that would be able to improve his  back pain or his left lower extremity weakness in any meaningful manner.  Again, the risks of any sort of surgery in the thoracolumbar spine would also be incredibly high risk.  Primarily, the patient is hoping to get back into physical therapy to optimize what ever function that he currently has.  This is a very reasonable request.  We will refer him to outside physical therapy which he can complete at his senior living facility.  The patient expressed his understanding and agreement with this assessment and plan.  All questions answered.    1. Referral to physical therapy  2. Follow-up in [facility] orthopedic spine clinic as needed    Saurav Wei MD  Orthopaedic Surgery PGY-4    Attestation:  I (Dr. Velasquez Hoyt - Spine Surgeon) have personally evaluated patient with PGY-4 Eriberto, and agree with findings and plan outlined in the note, which I also edited.  I discussed at length with the patient/family, explained the nature of spinal condition, and formulated workup and/or treatment plan together.  All questions were answered to the best of my ability and to patient's apparent satisfaction    60 minutes spent on the date of the encounter doing chart review/review of outside records/review of test results/interpretation of tests/patient visit/documentation/discussion with other provider(s)/discussion with patient and family.    Velasquez Hoyt MD    Orthopaedic Spine Surgery  Dept Orthopaedic Surgery, [facility]  [phone] office, [phone] pager  [email or web address]